# Patient Record
Sex: FEMALE | Race: WHITE
[De-identification: names, ages, dates, MRNs, and addresses within clinical notes are randomized per-mention and may not be internally consistent; named-entity substitution may affect disease eponyms.]

---

## 2018-01-31 ENCOUNTER — HOSPITAL ENCOUNTER (EMERGENCY)
Dept: HOSPITAL 53 - M ED | Age: 28
Discharge: HOME | End: 2018-01-31
Payer: COMMERCIAL
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
## 2018-01-31 DIAGNOSIS — S60.222A: Primary | ICD-10-CM

## 2018-01-31 PROCEDURE — 73130 X-RAY EXAM OF HAND: CPT

## 2019-06-22 ENCOUNTER — HOSPITAL ENCOUNTER (INPATIENT)
Dept: HOSPITAL 53 - M PCU | Age: 29
LOS: 6 days | Discharge: HOME | DRG: 776 | End: 2019-06-28
Payer: COMMERCIAL

## 2019-06-22 VITALS — WEIGHT: 156.53 LBS | BODY MASS INDEX: 28.8 KG/M2 | HEIGHT: 62 IN

## 2019-06-22 VITALS — SYSTOLIC BLOOD PRESSURE: 116 MMHG | DIASTOLIC BLOOD PRESSURE: 73 MMHG

## 2019-06-22 VITALS — DIASTOLIC BLOOD PRESSURE: 68 MMHG | SYSTOLIC BLOOD PRESSURE: 141 MMHG

## 2019-06-22 VITALS — DIASTOLIC BLOOD PRESSURE: 55 MMHG | SYSTOLIC BLOOD PRESSURE: 114 MMHG

## 2019-06-22 DIAGNOSIS — D64.9: ICD-10-CM

## 2019-06-22 DIAGNOSIS — Z88.8: ICD-10-CM

## 2019-06-22 DIAGNOSIS — I26.99: ICD-10-CM

## 2019-06-22 DIAGNOSIS — Z88.0: ICD-10-CM

## 2019-06-22 DIAGNOSIS — K59.00: ICD-10-CM

## 2019-06-22 LAB
ALBUMIN SERPL BCG-MCNC: 1.9 GM/DL (ref 3.2–5.2)
ALT SERPL W P-5'-P-CCNC: 151 U/L (ref 12–78)
APTT BLD: 32.2 SECONDS (ref 25–38.4)
BILIRUB SERPL-MCNC: 0.3 MG/DL (ref 0.2–1)
BUN SERPL-MCNC: 6 MG/DL (ref 7–18)
CALCIUM SERPL-MCNC: 8.3 MG/DL (ref 8.5–10.1)
CHLORIDE SERPL-SCNC: 112 MEQ/L (ref 98–107)
CO2 SERPL-SCNC: 19 MEQ/L (ref 21–32)
CREAT SERPL-MCNC: 0.42 MG/DL (ref 0.55–1.3)
GFR SERPL CREATININE-BSD FRML MDRD: > 60 ML/MIN/{1.73_M2} (ref 60–?)
GLUCOSE SERPL-MCNC: 102 MG/DL (ref 70–100)
HCT VFR BLD AUTO: 26.1 % (ref 36–47)
HCT VFR BLD AUTO: 28 % (ref 36–47)
HGB BLD-MCNC: 8.6 G/DL (ref 12–15.5)
HGB BLD-MCNC: 9 G/DL (ref 12–15.5)
INR PPP: 1.19
MCH RBC QN AUTO: 29.3 PG (ref 27–33)
MCH RBC QN AUTO: 29.7 PG (ref 27–33)
MCHC RBC AUTO-ENTMCNC: 32.1 G/DL (ref 32–36.5)
MCHC RBC AUTO-ENTMCNC: 33 G/DL (ref 32–36.5)
MCV RBC AUTO: 90 FL (ref 80–96)
MCV RBC AUTO: 91.2 FL (ref 80–96)
PLATELET # BLD AUTO: 117 10^3/UL (ref 150–450)
PLATELET # BLD AUTO: 123 10^3/UL (ref 150–450)
POTASSIUM SERPL-SCNC: 3.5 MEQ/L (ref 3.5–5.1)
PROT SERPL-MCNC: 5.6 GM/DL (ref 6.4–8.2)
PROTHROMBIN TIME: 14.8 SECONDS (ref 11.8–14)
RBC # BLD AUTO: 2.9 10^6/UL (ref 4–5.4)
RBC # BLD AUTO: 3.07 10^6/UL (ref 4–5.4)
SODIUM SERPL-SCNC: 140 MEQ/L (ref 136–145)
TROPONIN I SERPL-MCNC: 0.1 NG/ML (ref ?–0.1)
WBC # BLD AUTO: 8.2 10^3/UL (ref 4–10)
WBC # BLD AUTO: 9 10^3/UL (ref 4–10)

## 2019-06-22 RX ADMIN — HEPARIN SODIUM AND DEXTROSE SCH MLS/HR: 10000; 5 INJECTION INTRAVENOUS at 16:13

## 2019-06-22 RX ADMIN — SODIUM CHLORIDE SCH MLS/HR: 9 INJECTION, SOLUTION INTRAVENOUS at 21:19

## 2019-06-22 RX ADMIN — PROBIOTIC PRODUCT - TAB SCH EA: TAB at 21:18

## 2019-06-22 RX ADMIN — ACETAMINOPHEN PRN MG: 325 TABLET ORAL at 21:18

## 2019-06-22 RX ADMIN — PROBIOTIC PRODUCT - TAB SCH EA: TAB at 16:00

## 2019-06-22 RX ADMIN — DOCUSATE SODIUM,SENNOSIDES SCH TAB: 50; 8.6 TABLET, FILM COATED ORAL at 21:18

## 2019-06-22 RX ADMIN — ACETAMINOPHEN PRN MG: 325 TABLET ORAL at 14:44

## 2019-06-22 NOTE — HPEPDOC
General


Date of Admission


Jun 22, 2019 at 12:57


Date of Service:  Jun 22, 2019


Chief Complaint


The patient is a 28-year-old female admitted with a reason for visit of Severe 

Sepsis Post Partum, Pe.





History of Present Illness


27 yo F, post-partum day 3, no other known PMH transferred from [facility] while she was treated for severe sepsis under Dr. Sherri Dotson. She had a normal delivery on 6/19/2019, and she was successfully 

discharged. However, she developed a fever, chills, and presented to Dr. Dotson's office. She was found to have leukocytosis up to 21.6 with symptoms of 

sepsis. Therefore, the patient was hospitalized. Initial CBC 21.6>10.1<175. 

Lactate 3.6. She was tachycardic. She has evidence of endometritis. Of note, she

was found to have positive urine culture in Feb 2019 with Strep Agalactiae group

B, but the patient decided not to be treated. During this hospital admission, 

two sets of blood culture shows G + cocci. Urine culture is in progress, but UA 

showed large UE, blood, and WBC 30-50. Lactate has normalized to 0.9. However, 

she was still tachycardic to 130-140. Therefore, Dr. Dotson had CT angio, which 

confirmed PE in b/l lungs. Given post-partum d 3 bleeding, she received lovenox 

40 subcut. Given complicated hospital course, the patient was transferred to 

[facility] for further medical management. 





Upon arrival, /68, , 95-97% on RA. She is febrile at 101F.





Home Medications


Scheduled


Clindamycin in 0.9 % Sod Chlor (Clindamycin 900 mg/50 ml-Ns) 900 Mg/50 Ml 

Piggyback, 900 MG IV Q8H, (Reported)


   GIVEN AT [facility] 


Docusate Sodium (Colace) 100 Mg Capsule, 100 MG PO BID, (Reported)


Enoxaparin Sodium (Lovenox) 40 Mg/0.4 Ml Syringe, 40 MG SC DAILY, (Reported)


   GIVEN AT [facility] 


Ferrous Sulfate (Iron) 325 Mg Tablet, 325 MG PO Q8H, (Reported)


Folic Acid (Folic Acid) 1 Mg Tablet, 1 MG PO DAILY, (Reported)


Gentamicin in NaCl, Iso-Osm (Gentamicin 100 mg/Ns 100 ml) 100 Mg/100 Ml 

Piggyback, 110 MG IV Q8H, (Reported)


   GIVEN AT [facility] 


Prenatal Vit37/Iron/Folic Acid (Prenata Chewable Tablet) 1 Each Tab.chew, 1 CHW 

PO DAILY, (Reported)


Sennosides/Docusate Sodium (Senna-S Tablet) 1 Each Tablet, 1 TAB PO BID, 

(Reported)





Scheduled PRN


Acetaminophen (Acetaminophen) 325 Mg Tablet, 650 MG PO Q4H PRN for PAIN, 

(Reported)


Ibuprofen (Ibuprofen) 800 Mg Tablet, 800 MG PO TID PRN for PAIN, (Reported)





Allergies


Coded Allergies:  


     amoxicillin (Verified  Allergy, Unknown, HIVES, 6/22/19)


     cefprozil (Verified  Allergy, Unknown, HIVES, 6/22/19)





Past Medical History


Medical History


none


Surgical History


none





Family History


no significant FH





Social History


* Smoker:  Denies


Alcohol:  Denies


see above





A-FIB/CHADSVASC


A-FIB History


Current/History of A-Fib/PAF?:  No





Review of Systems


Constitutional:  Reports: Chills, Fever, Weakness


Eyes:  Denies: Pain, Vision change, Conjunctivae inflammation, Eyelid 

inflammation, Redness, Other


ENT:  Denies: Head Aches, Ear Pain, Dysphagia, Sinus Congestion, Post Nasal 

Drip, Sore Throat, Epistaxis, Other Symptoms


Skin:  Denies: Rash, Lesions, Jaundice, Bruising, Itching, Dry, Breakdown, Nail 

Changes, Other


Pulmonary:  Reports: Other Symptoms (chest tightness)


Cardiovascular:  Denies: Chest Pain, Palpitations, Orthopnea, Paroxysmal Noc. 

Dyspnea, Edema, Lt Headedness, Other Symptoms


Gastrointestinal:  Reports: Abdominal Pain, Constipation


Genitourinary:  Denies: Dysuria, Frequency, Incontinence, Hematuria, Retention, 

Other Symptoms


Hematologic:  Denies: Bruising, Bleeding Excessively, Petecchia, Purpura, 

Enlarged Lymph Nodes, Other Hematologic


Endocrine:  Denies: Polydipsia, Polyphagia, Polyuria, Heat Intolerance, Cold 

Intolerance, Other Endocrine Sx


Musculoskeletal:  Denies: Neck Pain, Back Pain, Shoulder Pain, Arm Pain, Hand 

Pain, Leg Pain, Foot Pain, Joint Pain, Muscle Pain, Spasms, Other Symptoms


Neurological:  Denies: Weakness, Numbness, Incoordination, Change in speech, 

Confusion, Seizures, Other Symptoms


Psych:  Denies: Mood Normal, Anxiety, Depression, Memory Issues, Thoughts of Verito

f Harm, Anger, Thoughts of Harming Other, Other Psych





Physical Examination


General Exam:  Positive: Alert, Cooperative, No Acute Distress


Eye Exam:  Positive: PERRLA


ENT Exam:  Positive: Atraumatic, Mucous membr. moist/pink


Neck Exam:  Positive: Supple


Chest Exam:  Positive: Clear to auscultation, Normal air movement


Heart Exam:  Positive: Tachycardic, Regular Rhythm


Abdomen Exam:  Positive: Normal bowel sounds


Extremity Exam:  Positive: Other (+1 edema b/l)


Skin Exam:  Positive: Nl turgor and temperature


Neuro Exam:  Positive: Normal Speech


Psych Exam:  Positive: Mood NL





Vital Signs





Vital Signs








  Date Time  Temp Pulse Resp B/P (MAP) Pulse Ox O2 Delivery O2 Flow Rate FiO2


 


6/22/19 13:35 101.4   116/73 (87)    


 


6/22/19 13:10  134   97   











Problems





(1) Severe sepsis


Problem Text:  # Severe sepsis from endometritis


- The patient is febrile, tachycardic, leukocytosis, evidence of endometritis, 

with elevated lactic acid up to 3.6. Therefore, the patient has severe sepsis. 


- She is allergic to penicillin and cephalosporin. As mentioned in HPI, she 

declined to be treated for Group B strep treatment, and it was sensitive to 

clindamycin (ARIA<0.25) and vancomycin (ARIA 0.5). She received clindamycin and 

gentamicin at outside hospital. 


- Will send CBC/CMP, lactic acid, UA, urine culture, 2 sets of blood culture. 

Outside blood culture grows gram (+) cocci. 


- Will start clindamycin (bacteriocital), rather than vancomycin (bacteri

ostatic). She was clinically improving on clindamycin at outside hospital. Also 

start aztreonam, which will continue until blood culture completely r/o gram (-)

bacteria. 





# Pulmonary embolism


- Currently, she is tachycardic and feels like her lungs are not expanding. This

seems to be the symptom of PE. 


- Will start heparin gtt w/o bolus, given persistent vaginal bleeding from de

livery. Heparin gtt can increase bleeding. Therefore, will carefully watch the 

degree of vaginal bleeding. PE can be life threatening. Therefore, if she has 

increased bleeding, will transfuse. Q6h CBC and PTT. 


- Today is Saturday, not clear if echocardiogram is available, will order one. 

She has b/l lower ext edema +1, will order lower ext doppler to r/o DVT - likely

be done on Monday. 





# Anemia


- This is likely combination of IV hydration and bleeding from vagina. 


- Will give a dose of IV iron. 





# Constipation


- Continue stool softner. 





Dr. Dotson, [phone], [facility] nursing station [phone], need to contact

for culture sensitivity and speciation








Plan / VTE


VTE Prophylaxis Ordered?:  Yes











AYAZ AREVALO MD                Jun 22, 2019 14:24

## 2019-06-23 VITALS — SYSTOLIC BLOOD PRESSURE: 116 MMHG | DIASTOLIC BLOOD PRESSURE: 53 MMHG

## 2019-06-23 VITALS — DIASTOLIC BLOOD PRESSURE: 47 MMHG | SYSTOLIC BLOOD PRESSURE: 106 MMHG

## 2019-06-23 VITALS — SYSTOLIC BLOOD PRESSURE: 117 MMHG | DIASTOLIC BLOOD PRESSURE: 62 MMHG

## 2019-06-23 VITALS — DIASTOLIC BLOOD PRESSURE: 70 MMHG | SYSTOLIC BLOOD PRESSURE: 122 MMHG

## 2019-06-23 VITALS — SYSTOLIC BLOOD PRESSURE: 125 MMHG | DIASTOLIC BLOOD PRESSURE: 71 MMHG

## 2019-06-23 VITALS — SYSTOLIC BLOOD PRESSURE: 125 MMHG | DIASTOLIC BLOOD PRESSURE: 59 MMHG

## 2019-06-23 VITALS — SYSTOLIC BLOOD PRESSURE: 132 MMHG | DIASTOLIC BLOOD PRESSURE: 84 MMHG

## 2019-06-23 LAB
ALBUMIN SERPL BCG-MCNC: 2.1 GM/DL (ref 3.2–5.2)
ALT SERPL W P-5'-P-CCNC: 248 U/L (ref 12–78)
BILIRUB SERPL-MCNC: 0.5 MG/DL (ref 0.2–1)
BUN SERPL-MCNC: 8 MG/DL (ref 7–18)
CALCIUM SERPL-MCNC: 8 MG/DL (ref 8.5–10.1)
CHLORIDE SERPL-SCNC: 109 MEQ/L (ref 98–107)
CO2 SERPL-SCNC: 22 MEQ/L (ref 21–32)
CREAT SERPL-MCNC: 0.63 MG/DL (ref 0.55–1.3)
GFR SERPL CREATININE-BSD FRML MDRD: > 60 ML/MIN/{1.73_M2} (ref 60–?)
GLUCOSE SERPL-MCNC: 77 MG/DL (ref 70–100)
HCT VFR BLD AUTO: 29.7 % (ref 36–47)
HGB BLD-MCNC: 9.7 G/DL (ref 12–15.5)
MCH RBC QN AUTO: 29.2 PG (ref 27–33)
MCHC RBC AUTO-ENTMCNC: 32.7 G/DL (ref 32–36.5)
MCV RBC AUTO: 89.5 FL (ref 80–96)
PLATELET # BLD AUTO: 159 10^3/UL (ref 150–450)
POTASSIUM SERPL-SCNC: 3.7 MEQ/L (ref 3.5–5.1)
PROT SERPL-MCNC: 5.9 GM/DL (ref 6.4–8.2)
RBC # BLD AUTO: 3.32 10^6/UL (ref 4–5.4)
SODIUM SERPL-SCNC: 139 MEQ/L (ref 136–145)
WBC # BLD AUTO: 10.4 10^3/UL (ref 4–10)

## 2019-06-23 RX ADMIN — ACETAMINOPHEN PRN MG: 325 TABLET ORAL at 05:05

## 2019-06-23 RX ADMIN — ACETAMINOPHEN PRN MG: 325 TABLET ORAL at 22:28

## 2019-06-23 RX ADMIN — PROBIOTIC PRODUCT - TAB SCH EA: TAB at 21:23

## 2019-06-23 RX ADMIN — ACETAMINOPHEN PRN MG: 325 TABLET ORAL at 17:20

## 2019-06-23 RX ADMIN — ACETAMINOPHEN PRN MG: 325 TABLET ORAL at 10:09

## 2019-06-23 RX ADMIN — SODIUM CHLORIDE SCH MLS/HR: 9 INJECTION, SOLUTION INTRAVENOUS at 21:23

## 2019-06-23 RX ADMIN — DOCUSATE SODIUM,SENNOSIDES SCH TAB: 50; 8.6 TABLET, FILM COATED ORAL at 21:23

## 2019-06-23 RX ADMIN — PROBIOTIC PRODUCT - TAB SCH EA: TAB at 10:09

## 2019-06-23 RX ADMIN — PROBIOTIC PRODUCT - TAB SCH EA: TAB at 17:28

## 2019-06-23 RX ADMIN — SODIUM CHLORIDE SCH MLS/HR: 9 INJECTION, SOLUTION INTRAVENOUS at 14:46

## 2019-06-23 RX ADMIN — SODIUM CHLORIDE SCH MLS/HR: 9 INJECTION, SOLUTION INTRAVENOUS at 06:30

## 2019-06-23 RX ADMIN — DOCUSATE SODIUM,SENNOSIDES SCH TAB: 50; 8.6 TABLET, FILM COATED ORAL at 10:08

## 2019-06-23 RX ADMIN — HEPARIN SODIUM AND DEXTROSE SCH MLS/HR: 10000; 5 INJECTION INTRAVENOUS at 12:36

## 2019-06-23 NOTE — ECHO
DATE OF STUDY:  06/22/2019

 

REFERRING PHYSICIAN:  Dr. Rubi Winston

 

INDICATION:  Acute pulmonary embolism.

 

HEIGHT:  61 inches.

 

WEIGHT:  154 pounds.

 

2D MEASUREMENTS:

Ventricular septum 0.8 cm

Posterior wall 0.87 cm

Left ventricle diastole 4.6 cm

Left atrium 3.0 cm

Aortic root 3.0 cm

Inferior vena cava 1.6 cm (more than 50% respiratory variation)

 

DOPPLER MEASUREMENTS:

Aortic valve velocity 218 cm/s

LVOT velocity 163 cm/s

LVOT VTI 25.9 cm

Mitral E velocity 104 cm/s

Mitral A velocity 135 cm/s

Very mild tricuspid regurgitation

Estimated right ventricle systolic pressure 39-44 mmHg assuming a right atrial

pressure of 5-10 mmHg

 

MITRAL ANNULAR TISSUE DOPPLER:

E prime septal 14.7 cm/s

E prime lateral 24.6 cm/s

 

DESCRIPTION:

Rhythm was sinus tachycardia.  Image quality was good.  No pericardial effusion.

This was a 2D, M mode, color flow Doppler and pulse wave Doppler examination and

included mitral annular tissue Doppler.

 

CONCLUSIONS:

1.  Suggestive of at least mild-moderate elevation of estimated right ventricle

systolic pressure (at least 39-44 mmHg).  Normal right ventricle size and

systolic function.  Normal central venous pressure (estimated to be  5-10 mmHg).

 

 

2.  Normal left ventricle internal dimensions and wall thickness.  Normal

regional LV wall motion and wall thickening.  Normal LV systolic function.  LVEF

65-70% by visual estimate.  Normal LV diastolic function.

 

3.  Otherwise, normal appearing echocardiogram Doppler findings.

## 2019-06-23 NOTE — IPNPDOC
Date Seen


The patient was seen on 6/23/19.





Progress Note


SUBJECTIVE: Ms Babb is a 28-year-old female who is postpartum. She unfortunately

has had complications of sepsis, endometritis and bilateral pulmonary emboli. 

She is not having remarkable pain. She is having discomfort with deep 

inspiration.





OBJECTIVE


PHYSICAL EXAMINATION:


VITAL SIGNS: Please see below. 


GENERAL: [Awake, alert, conversant. She is noted to have a sallow complexion but

 is otherwise not ill appearing]


HEENT: [Neck is supple with no adenopathy or thyromegaly, oral mucosa is moist, 

she has good dentition, she does not have scleral icterus or injection.]


CARDIOVASCULAR: [Regular rate and rhythm, no appreciable murmur].


RESPIRATORY: [Patient overall has good air movement, she does have tightness 

with inspiration, which induces cough.].


ABDOMINAL: [Soft, post-gravid, nondistended, bowel tones are present]


EXTREMITIES: [No peripheral edema, pedal pulses are palpable]


NEUROLOGICAL: [No focal neuromotor or sensory deficit]


PSYCHOLOGICAL: [Cognition, judgment and insight appear to be intact.]





LABORATORY DATA, IMAGING STUDIES, MICROBIOLOGY: Please see below.





Echocardiogram: [Echocardiogram shows normal ejection fraction of 65-70% with 

normal diastolic function. Some increased right ventricular systolic pressure 39

 and 44 mmHg which could be consistent with pulmonary embolus.].





DVT prophylaxis ordered?: [heparin gtt]





ASSESSMENT/PLAN: 





1. Sepsis--patient has endometritis. Blood cultures from outlying facility are 

positive for strep group B. Patient remains on meropenem. She has been afebrile 

and leukocytosis is resolved.





2. Bilateral pulmonary emboli--patient is on anticoagulation with heparin drip. 

She is expectant of lower extremity Dopplers as she does have lower extremity 

swelling. She will need to be on longer term anticoagulation. We will need to 

consider the fact that she plans to breast-feed in selecting an appropriate 

agent.





3. Elevated liver enzymes--the etiology of these is unclear. Bilirubin is 

normal. We will likely arrange for right upper quadrant ultrasound to eval the 

gallbladder and biliary tree.. She may also need vascular ultrasound of the area

as well.








DISPOSITION: .





VS, I&O, 24H, Fishbone


Vital Signs/I&O





Vital Signs








  Date Time  Temp Pulse Resp B/P (MAP) Pulse Ox O2 Delivery O2 Flow Rate FiO2


 


6/23/19 16:00 97.5 120 18 132/84 (100) 95   














I&O- Last 24 Hours up to 6 AM 


 


 6/23/19





 06:00


 


Intake Total 2022 ml


 


Output Total 2050 ml


 


Balance -28 ml











Laboratory Data


24H LABS


Laboratory Tests 2


6/22/19 22:12: 


Nucleated Red Blood Cells % (auto) 0.0, Activated Partial Thromboplast Time 

73.5H, Troponin I 0.09


6/23/19 04:15: 


Nucleated Red Blood Cells % (auto) 0.0, Activated Partial Thromboplast Time 

71.9H


6/23/19 04:16: 


Anion Gap 8, Glomerular Filtration Rate > 60.0, Blood Urea Nitrogen 8, 

Creatinine 0.63, Sodium Level 139, Potassium Level 3.7, Chloride Level 109H, 

Carbon Dioxide Level 22, Calcium Level 8.0L, Aspartate Amino Transf (AST/SGOT) 

203H, Alanine Aminotransferase (ALT/SGPT) 248H, Alkaline Phosphatase 207H, Total

Bilirubin 0.5#, Total Protein 5.9L, Albumin 2.1L, Albumin/Globulin Ratio 0.55L


CBC/BMP


Laboratory Tests


6/22/19 22:12








Red Blood Count 2.90 L, Mean Corpuscular Volume 90.0, Mean Corpuscular 

Hemoglobin 29.7, Mean Corpuscular Hemoglobin Concent 33.0, Red Cell Distribution

Width 13.7





6/23/19 04:15








Red Blood Count 3.32 L, Mean Corpuscular Volume 89.5, Mean Corpuscular 

Hemoglobin 29.2, Mean Corpuscular Hemoglobin Concent 32.7, Red Cell Distribution

Width 13.9





6/23/19 04:16








Calcium Level 8.0 L, Aspartate Amino Transf (AST/SGOT) 203 H, Alanine 

Aminotransferase (ALT/SGPT) 248 H, Alkaline Phosphatase 207 H, Total Bilirubin 

0.5 #, Total Protein 5.9 L, Albumin 2.1 L


Microbiology





Microbiology


6/22/19 Blood Culture - Preliminary, Resulted


          No growth after 24 hours . All specim...


6/22/19 Blood Culture - Preliminary, Resulted


          No growth after 24 hours . All specim...











CLARK SONG MD         Jun 23, 2019 17:12

## 2019-06-24 VITALS — SYSTOLIC BLOOD PRESSURE: 119 MMHG | DIASTOLIC BLOOD PRESSURE: 77 MMHG

## 2019-06-24 VITALS — SYSTOLIC BLOOD PRESSURE: 130 MMHG | DIASTOLIC BLOOD PRESSURE: 70 MMHG

## 2019-06-24 VITALS — SYSTOLIC BLOOD PRESSURE: 127 MMHG | DIASTOLIC BLOOD PRESSURE: 70 MMHG

## 2019-06-24 VITALS — DIASTOLIC BLOOD PRESSURE: 73 MMHG | SYSTOLIC BLOOD PRESSURE: 131 MMHG

## 2019-06-24 VITALS — DIASTOLIC BLOOD PRESSURE: 74 MMHG | SYSTOLIC BLOOD PRESSURE: 135 MMHG

## 2019-06-24 VITALS — SYSTOLIC BLOOD PRESSURE: 127 MMHG | DIASTOLIC BLOOD PRESSURE: 69 MMHG

## 2019-06-24 VITALS — DIASTOLIC BLOOD PRESSURE: 70 MMHG | SYSTOLIC BLOOD PRESSURE: 130 MMHG

## 2019-06-24 LAB
ALBUMIN SERPL BCG-MCNC: 1.9 GM/DL (ref 3.2–5.2)
ALT SERPL W P-5'-P-CCNC: 172 U/L (ref 12–78)
BILIRUB SERPL-MCNC: 0.5 MG/DL (ref 0.2–1)
BUN SERPL-MCNC: 11 MG/DL (ref 7–18)
CALCIUM SERPL-MCNC: 7.7 MG/DL (ref 8.5–10.1)
CHLORIDE SERPL-SCNC: 112 MEQ/L (ref 98–107)
CO2 SERPL-SCNC: 19 MEQ/L (ref 21–32)
CREAT SERPL-MCNC: 0.58 MG/DL (ref 0.55–1.3)
GFR SERPL CREATININE-BSD FRML MDRD: > 60 ML/MIN/{1.73_M2} (ref 60–?)
GLUCOSE SERPL-MCNC: 80 MG/DL (ref 70–100)
POTASSIUM SERPL-SCNC: 3.3 MEQ/L (ref 3.5–5.1)
PROT SERPL-MCNC: 5.4 GM/DL (ref 6.4–8.2)
SODIUM SERPL-SCNC: 141 MEQ/L (ref 136–145)

## 2019-06-24 RX ADMIN — SODIUM CHLORIDE SCH MLS/HR: 9 INJECTION, SOLUTION INTRAVENOUS at 06:12

## 2019-06-24 RX ADMIN — MEROPENEM AND SODIUM CHLORIDE SCH MLS/HR: 1 INJECTION, SOLUTION INTRAVENOUS at 21:52

## 2019-06-24 RX ADMIN — HEPARIN SODIUM AND DEXTROSE SCH MLS/HR: 10000; 5 INJECTION INTRAVENOUS at 12:27

## 2019-06-24 RX ADMIN — PROBIOTIC PRODUCT - TAB SCH EA: TAB at 21:53

## 2019-06-24 RX ADMIN — PROBIOTIC PRODUCT - TAB SCH EA: TAB at 16:37

## 2019-06-24 RX ADMIN — DOCUSATE SODIUM,SENNOSIDES SCH TAB: 50; 8.6 TABLET, FILM COATED ORAL at 08:38

## 2019-06-24 RX ADMIN — POTASSIUM CHLORIDE SCH MEQ: 750 TABLET, EXTENDED RELEASE ORAL at 08:38

## 2019-06-24 RX ADMIN — IBUPROFEN PRN MG: 800 TABLET, FILM COATED ORAL at 16:37

## 2019-06-24 RX ADMIN — PROBIOTIC PRODUCT - TAB SCH EA: TAB at 08:38

## 2019-06-24 RX ADMIN — DOCUSATE SODIUM,SENNOSIDES SCH TAB: 50; 8.6 TABLET, FILM COATED ORAL at 21:53

## 2019-06-24 RX ADMIN — SODIUM CHLORIDE SCH MLS/HR: 9 INJECTION, SOLUTION INTRAVENOUS at 14:40

## 2019-06-24 NOTE — REP
Abdominal right upper quadrant ultrasound for elevated liver function tests:

There is ascites along the medial border of the hepatic right lobe.

Additionally, there is a right pleural effusion.

There is no cholelithiasis.

There is pericholecystic fluid, possibly ascites.

The gallbladder wall is thickened measuring up to 6.9 mm.  This may be secondary

to the ascites.

There is no intrahepatic or extrahepatic biliary duct dilatation.  The common

duct measures 2.2 mm.

The hepatic parenchyma is homogeneous and otherwise unremarkable.

The visualized portions of the pancreas are unremarkable.

There is no right renal calculus, mass, cyst or hydronephrosis.

The right kidney is normal size measuring 12.7 x 7.8 x 3.8 cm.

Impression:

There is ascites along the medial margin of the hepatic right lobe.

There is no cholelithiasis.

There is pericholecystic fluid, possibly ascites.

The gallbladder wall is thickened measuring up to 6.90 mm, possibly secondary to

the ascites.

There is no biliary duct dilatation.

 

 

Electronically Signed by

Francisco Ortiz MD 06/24/2019 08:33 A

## 2019-06-24 NOTE — REPVR
EXAM: 

 XR Chest, 1 View 



EXAM DATE/TIME: 

 6/24/2019 3:05 AM 



CLINICAL HISTORY: 

 28 years old, female; Shortness of breath; Patient HX: Septic, post partum, PE 



TECHNIQUE: 

 Imaging protocol: XR of the chest, 1 view. 



COMPARISON: 

 No relevant prior studies available. 



FINDINGS: 

 Lungs: There are bibasilar airspace opacities. 

 Pleural space: There is blunting of the costophrenic sulci bilaterally, which 

may indicate bilateral pleural effusions. No pneumothorax is identified. 

 Heart/Mediastinum: Unremarkable. No cardiomegaly. 

 Bones/joints: Unremarkable. 



IMPRESSION: 

1. Bibasilar airspace opacities, which represent atelectasis, pneumonia, or 

aspiration pneumonia. 

2. Blunting of the costophrenic sulci bilaterally, which may indicate bilateral 

pleural effusions. 



Electronically signed by: Franklyn Hermosillo On 06/24/2019  04:26:35 AM

## 2019-06-24 NOTE — REP
Clinical: Postpartum with bilateral lower extremity pain .

 

Technique:  Gray scale and color Doppler evaluation using linear high frequency

transducer.

 

Findings:

Ultrasound examination of the right and left lower extremity deep venous

structures from the common femoral vein to the popliteal vein demonstrates normal

compressibility flow and wave patterns in response to respiration and

augmentation.  There is no evidence for deep venous thrombosis.

 

Impression:

No evidence for deep venous thrombosis.

 

 

Electronically Signed by

Jason Villalta MD 06/24/2019 06:14 P

## 2019-06-24 NOTE — IPNPDOC
Date Seen


The patient was seen on 6/24/19.





Progress Note


SUBJECTIVE: This is a 28-year-old female with postpartum complications of 

sepsis, endometritis, and bilateral pulmonary emboli. She's been having some 

episodes of breathlessness and so is requiring some supplemental oxygen. She is 

not having chest pain. She is having some episodes of fever.





OBJECTIVE


PHYSICAL EXAMINATION:


VITAL SIGNS: Please see below. 


GENERAL: Awake, alert, conversant. She is noted to have a sallow complexion but 

is otherwise not ill appearing


HEENT: Neck is supple with no adenopathy or thyromegaly, oral mucosa is moist, 

she has good dentition, she does not have scleral icterus or injection.


CARDIOVASCULAR: Regular rate and rhythm, no appreciable murmur.


RESPIRATORY: Patient overall has good air movement, she does have tightness with

inspiration, which induces cough..


ABDOMINAL: Soft, post-gravid, nondistended, mildly tender to deep palpation 

bowel tones are present


EXTREMITIES: No peripheral edema, pedal pulses are palpable


NEUROLOGICAL: No focal neuromotor or sensory deficit


PSYCHOLOGICAL: Cognition, judgment and insight appear to be intact.


SKIN: The patient does not have jaundice





LABORATORY DATA, IMAGING STUDIES, MICROBIOLOGY: Please see below.





Echocardiogram: .





DVT prophylaxis ordered?: Patient is currently on a heparin drip





ASSESSMENT/PLAN:


1. Sepsis--patient has endometritis. Blood cultures from outlying facility are 

positive for strep group B. Patient remains on meropenem. She has allergies to 

penicillin and reported cephalosporin. Leukocytosis is resolved. She has had 

fever, however.





2. Bilateral pulmonary emboli--patient is on anticoagulation with heparin drip. 

She is expectant of lower extremity Dopplers as she does have mild lower 

extremity swelling. She will need to be on longer term anticoagulation. The 

patient can be started on Coumadin therapy; it is not excreted into breast milk 

and consequently is not contraindicated. She can be transitioned to Lovenox for 

bridge therapy.





3. Elevated liver enzymes--the etiology of these is unclear. Bilirubin is 

normal. Liver enzyme levels are decreasing. Ultrasound is negative for ch

olelithiasis or cholecystitis. We'll continue to monitor.





4. The patient had had some breast firmness and discomfort. She did not get 

relief from the breast pump. She has nursed her baby a few times and her milk 

flow is improving and this issue is resolved. There are no residual concerns for

mastitis at this time.











DISPOSITION: .





VS, I&O, 24H, Fishbone


Vital Signs/I&O





Vital Signs








  Date Time  Temp Pulse Resp B/P (MAP) Pulse Ox O2 Delivery O2 Flow Rate FiO2


 


6/24/19 16:00 102.7 131 20 135/74 (94) 97  3.0 














I&O- Last 24 Hours up to 6 AM 


 


 6/24/19





 06:00


 


Intake Total 1406 ml


 


Output Total 2700 ml


 


Balance -1294 ml











Laboratory Data


24H LABS


Laboratory Tests 2


6/24/19 05:55: 


Anion Gap 10, Glomerular Filtration Rate > 60.0, Blood Urea Nitrogen 11, 

Creatinine 0.58, Sodium Level 141, Potassium Level 3.3L, Chloride Level 112H, 

Carbon Dioxide Level 19L, Calcium Level 7.7L, Aspartate Amino Transf (AST/SGOT) 

94H, Alanine Aminotransferase (ALT/SGPT) 172H, Alkaline Phosphatase 243H, Total 

Bilirubin 0.5, Total Protein 5.4L, Albumin 1.9L, Albumin/Globulin Ratio 0.54L


6/24/19 05:56: Activated Partial Thromboplast Time 82.1H


CBC/BMP


Laboratory Tests


6/24/19 05:55








Calcium Level 7.7 L, Aspartate Amino Transf (AST/SGOT) 94 H, Alanine 

Aminotransferase (ALT/SGPT) 172 H, Alkaline Phosphatase 243 H, Total Bilirubin 

0.5, Total Protein 5.4 L, Albumin 1.9 L


Microbiology





Microbiology


6/22/19 Blood Culture - Preliminary, Resulted


          No Growth after 48 hours. All Specime...


6/22/19 Blood Culture - Preliminary, Resulted


          No Growth after 48 hours. All Specime...


6/24/19 Urine Culture, Received


          Pending











CLARK SONG MD         Jun 24, 2019 16:59

## 2019-06-25 VITALS — SYSTOLIC BLOOD PRESSURE: 126 MMHG | DIASTOLIC BLOOD PRESSURE: 67 MMHG

## 2019-06-25 VITALS — SYSTOLIC BLOOD PRESSURE: 120 MMHG | DIASTOLIC BLOOD PRESSURE: 72 MMHG

## 2019-06-25 VITALS — SYSTOLIC BLOOD PRESSURE: 119 MMHG | DIASTOLIC BLOOD PRESSURE: 65 MMHG

## 2019-06-25 VITALS — SYSTOLIC BLOOD PRESSURE: 132 MMHG | DIASTOLIC BLOOD PRESSURE: 82 MMHG

## 2019-06-25 VITALS — SYSTOLIC BLOOD PRESSURE: 115 MMHG | DIASTOLIC BLOOD PRESSURE: 63 MMHG

## 2019-06-25 VITALS — SYSTOLIC BLOOD PRESSURE: 141 MMHG | DIASTOLIC BLOOD PRESSURE: 67 MMHG

## 2019-06-25 LAB
ALBUMIN SERPL BCG-MCNC: 1.9 GM/DL (ref 3.2–5.2)
ALT SERPL W P-5'-P-CCNC: 140 U/L (ref 12–78)
BILIRUB SERPL-MCNC: 0.5 MG/DL (ref 0.2–1)
BUN SERPL-MCNC: 16 MG/DL (ref 7–18)
CALCIUM SERPL-MCNC: 8.1 MG/DL (ref 8.5–10.1)
CHLORIDE SERPL-SCNC: 115 MEQ/L (ref 98–107)
CO2 SERPL-SCNC: 18 MEQ/L (ref 21–32)
CREAT SERPL-MCNC: 0.6 MG/DL (ref 0.55–1.3)
GFR SERPL CREATININE-BSD FRML MDRD: > 60 ML/MIN/{1.73_M2} (ref 60–?)
GLUCOSE SERPL-MCNC: 101 MG/DL (ref 70–100)
INR PPP: 1.18
POTASSIUM SERPL-SCNC: 3.2 MEQ/L (ref 3.5–5.1)
PROT SERPL-MCNC: 5.7 GM/DL (ref 6.4–8.2)
PROTHROMBIN TIME: 14.7 SECONDS (ref 11.8–14)
SODIUM SERPL-SCNC: 143 MEQ/L (ref 136–145)

## 2019-06-25 RX ADMIN — POTASSIUM CHLORIDE SCH MEQ: 750 TABLET, EXTENDED RELEASE ORAL at 09:13

## 2019-06-25 RX ADMIN — PROBIOTIC PRODUCT - TAB SCH EA: TAB at 16:09

## 2019-06-25 RX ADMIN — PROBIOTIC PRODUCT - TAB SCH EA: TAB at 09:13

## 2019-06-25 RX ADMIN — HEPARIN SODIUM AND DEXTROSE SCH MLS/HR: 10000; 5 INJECTION INTRAVENOUS at 12:36

## 2019-06-25 RX ADMIN — PROBIOTIC PRODUCT - TAB SCH EA: TAB at 21:10

## 2019-06-25 RX ADMIN — Medication SCH: at 20:06

## 2019-06-25 RX ADMIN — Medication SCH: at 06:41

## 2019-06-25 RX ADMIN — SODIUM CHLORIDE PRN UNITS: 4.5 INJECTION, SOLUTION INTRAVENOUS at 06:39

## 2019-06-25 RX ADMIN — DOCUSATE SODIUM,SENNOSIDES SCH TAB: 50; 8.6 TABLET, FILM COATED ORAL at 09:13

## 2019-06-25 RX ADMIN — SODIUM CHLORIDE PRN UNITS: 4.5 INJECTION, SOLUTION INTRAVENOUS at 20:04

## 2019-06-25 RX ADMIN — DOCUSATE SODIUM,SENNOSIDES SCH TAB: 50; 8.6 TABLET, FILM COATED ORAL at 21:10

## 2019-06-25 RX ADMIN — WARFARIN SODIUM SCH MG: 5 TABLET ORAL at 16:09

## 2019-06-25 RX ADMIN — MEROPENEM AND SODIUM CHLORIDE SCH MLS/HR: 1 INJECTION, SOLUTION INTRAVENOUS at 06:02

## 2019-06-25 RX ADMIN — MEROPENEM AND SODIUM CHLORIDE SCH MLS/HR: 1 INJECTION, SOLUTION INTRAVENOUS at 14:09

## 2019-06-25 RX ADMIN — IBUPROFEN PRN MG: 800 TABLET, FILM COATED ORAL at 23:21

## 2019-06-25 RX ADMIN — IBUPROFEN PRN MG: 800 TABLET, FILM COATED ORAL at 03:47

## 2019-06-25 RX ADMIN — MEROPENEM AND SODIUM CHLORIDE SCH MLS/HR: 1 INJECTION, SOLUTION INTRAVENOUS at 21:11

## 2019-06-25 NOTE — IPNPDOC
Text Note


Date of Service


The patient was seen on 19.





NOTE





Pt was seen and examined at bedside. Pt is postpartum day #6. She denies any 

lower abdomen pain or tenderness. Denies any fever chills. Pt is breastfeeding 

her . Pt denies any episode of bleeding. INR <2 on warfarin 4 mg. Heparin

gtt running. Pt has good po intake, ambulates well. LE Doppler U/S negative for 

any DVT. Echo result was reviewed.





PHE:





VITAL SIGNS: Please see below. 


GENERAL: AAOx3 NAD


HEENT: no jaundice neck supple no thyromegaly


CARDIOVASCULAR: Regular rate and rhythm S1 S2


RESPIRATORY: clear bilat no wheeze no rales


ABDOMINAL: soft NT ND


EXTREMITIES: No peripheral edema, pedal pulses are palpable


NEUROLOGICAL: No focal neuromotor or sensory deficit


PSYCHOLOGICAL: mood affect appropriate








LABORATORY DATA, IMAGING STUDIES, MICROBIOLOGY: Please see below.





Echocardiogram: 





DESCRIPTION:


Rhythm was sinus tachycardia.  Image quality was good.  No pericardial effusion.


This was a 2D, M mode, color flow Doppler and pulse wave Doppler examination and


included mitral annular tissue Doppler.


 


CONCLUSIONS:


1.  Suggestive of at least mild-moderate elevation of estimated right ventricle


systolic pressure (at least 39-44 mmHg).  Normal right ventricle size and


systolic function.  Normal central venous pressure (estimated to be  5-10 mmHg).


 


 


2.  Normal left ventricle internal dimensions and wall thickness.  Normal


regional LV wall motion and wall thickening.  Normal LV systolic function.  LVEF


65-70% by visual estimate.  Normal LV diastolic function.


 


3.  Otherwise, normal appearing echocardiogram Doppler findings.








Vital Signs








  Date Time  Temp Pulse Resp B/P (MAP) Pulse Ox O2 Delivery O2 Flow Rate FiO2


 


19 12:00       3.0 


 


19 11:37 97.3 99 18 141/67 (91) 97  3.0 


 


19 08:00       3.0 


 


19 07:53 97.9 89 17 115/63 (80) 94  3.0 


 


19 04:00 100.3 120 16 126/67 (86) 93   


 


19 23:59 98.3 103 16 127/69 (88) 93   


 


19 20:00 99.2 112 16 119/77 (91) 92   


 


19 17:48 100.0       


 


19 16:00       2.0 


 


19 16:00 102.7 131 20 135/74 (94) 97  3.0 


 


19 14:49 102.0       














Intake & Output 


 


 19





 06:00


 


Intake Total 241 ml


 


Output Total 400 ml


 


Balance -159 ml





Laboratory Tests


19 05:35: 


Activated Partial Thromboplast Time 56.9H, Blood Urea Nitrogen 16, Creatinine 

0.60, Sodium Level 143, Potassium Level 3.2L, Chloride Level 115H, Carbon 

Dioxide Level 18L, Calcium Level 8.1L, Aspartate Amino Transf (AST/SGOT) 60H, 

Alanine Aminotransferase (ALT/SGPT) 140H, Alkaline Phosphatase 266H, Total 

Bilirubin 0.5, Total Protein 5.7L, Albumin 1.9L, Anion Gap 10, Glomerular 

Filtration Rate > 60.0, Fasting Glucose 101H, Albumin/Globulin Ratio 0.50L


19 10:22: 


Prothrombin Time 14.7H, Prothromb Time International Ratio 1.18


19 12:53: Activated Partial Thromboplast Time 82.3H





Microbiology


19 Urine Culture - Final, Complete


          





Current Medications








 Medications


  (Trade)  Dose


 Ordered  Sig/Bryn


 Route


 PRN Reason  Start Time


 Stop Time Status Last Admin


Dose Admin


 


 Heparin Sodium


  (Porcine)


  (Heparin)    ASDIRECTED  PRN


 IV


 SEE LABEL COMMENTS  19 14:00


    19 06:39


2,900 UNITS


 


 Heparin Sodium


  (Porcine) 92562


 units/IV


 Miscellaneous


 Supplies  250 ml @ 0


 mls/hr  Q0M


 IV


   19 13:58


    19 12:36


12 MLS/HR


 


 Ibuprofen


  (Advil)  800 mg  Q8HP  PRN


 PO


 FEVER  19 16:30


    19 03:47


800 MG


 


 Lactobacillus


 Acidophilus


  (Bacid)  1 ea  TID


 PO


   19 16:00


    19 09:13


1 EA


 


 Meropenem 1 gm/IV


 Miscellaneous


 Supplies  50 ml @ 


 100 mls/hr  Q8H


 IV


   19 22:00


    19 14:09


100 MLS/HR


 


 Non-Formulary


 Medication


  (Heparin Iv Rate


 Change


 Documentation ml/


 Hr)    ASDIRECTED


 XX


   19 14:00


 19 13:59  19 06:41


1,200


 


 Potassium Chloride


  (Micro-K


 Extencaps)  20 meq  DAILY


 PO


   19 09:00


    19 09:13


20 MEQ


 


 Senna/Docusate


 Sodium


  (Senokot S)  2 tab  BID


 PO


   19 21:00


    19 09:13


2 TAB


 


 Warfarin Sodium


  (Coumadin)  4 mg  DAILY@17


 PO


   19 17:00


    19 16:37


4 MG











ASSESSMENT/PLAN:





1. Sepsis sec to postpartum endometritis 


hemodynamically improved


WBC trended down


On meropenem due to PCN allergy


Blood Culture from outside [facility]


Blood Culture on  negative x2


Denies any lower abdomen pain tenderness or vaginal discharge





2.Bilateral PE


Echo reviewed, mild to moderate stress on Rt Ventricle


Cont close clinical monitoring


As evidenced in CTA from outside, record not available


Con Heparin gtt 


Warfarin dose adjusted, INR in AM


Doppler LE negative





3. Elevated liver enzymes


cont to monitor 


likely sec to postpartum status





Discharge planning: pt can be dc home once INR therapeutic.





VS,Fishbone, I+O


VS, Fishbone, I+O


Laboratory Tests


19 05:35








Calcium Level 8.1 L, Aspartate Amino Transf (AST/SGOT) 60 H, Alanine 

Aminotransferase (ALT/SGPT) 140 H, Alkaline Phosphatase 266 H, Total Bilirubin 

0.5, Total Protein 5.7 L, Albumin 1.9 L








Vital Signs








  Date Time  Temp Pulse Resp B/P (MAP) Pulse Ox O2 Delivery O2 Flow Rate FiO2


 


19 12:00       3.0 


 


19 11:37 97.3 99 18 141/67 (91) 97   














I&O- Last 24 Hours up to 6 AM 


 


 19





 06:00


 


Intake Total 241 ml


 


Output Total 400 ml


 


Balance -159 ml

















BHARATHI AQUINO MD             2019 14:14

## 2019-06-26 VITALS — DIASTOLIC BLOOD PRESSURE: 70 MMHG | SYSTOLIC BLOOD PRESSURE: 131 MMHG

## 2019-06-26 VITALS — SYSTOLIC BLOOD PRESSURE: 122 MMHG | DIASTOLIC BLOOD PRESSURE: 64 MMHG

## 2019-06-26 VITALS — SYSTOLIC BLOOD PRESSURE: 135 MMHG | DIASTOLIC BLOOD PRESSURE: 73 MMHG

## 2019-06-26 VITALS — DIASTOLIC BLOOD PRESSURE: 60 MMHG | SYSTOLIC BLOOD PRESSURE: 122 MMHG

## 2019-06-26 VITALS — DIASTOLIC BLOOD PRESSURE: 66 MMHG | SYSTOLIC BLOOD PRESSURE: 124 MMHG

## 2019-06-26 VITALS — DIASTOLIC BLOOD PRESSURE: 60 MMHG | SYSTOLIC BLOOD PRESSURE: 123 MMHG

## 2019-06-26 VITALS — SYSTOLIC BLOOD PRESSURE: 134 MMHG | DIASTOLIC BLOOD PRESSURE: 91 MMHG

## 2019-06-26 LAB
ALBUMIN SERPL BCG-MCNC: 1.8 GM/DL (ref 3.2–5.2)
ALT SERPL W P-5'-P-CCNC: 109 U/L (ref 12–78)
BILIRUB SERPL-MCNC: 0.3 MG/DL (ref 0.2–1)
BUN SERPL-MCNC: 15 MG/DL (ref 7–18)
CALCIUM SERPL-MCNC: 7.9 MG/DL (ref 8.5–10.1)
CHLORIDE SERPL-SCNC: 115 MEQ/L (ref 98–107)
CO2 SERPL-SCNC: 20 MEQ/L (ref 21–32)
CREAT SERPL-MCNC: 0.52 MG/DL (ref 0.55–1.3)
EOSINOPHIL NFR BLD MANUAL: 2 % (ref 0–5)
GFR SERPL CREATININE-BSD FRML MDRD: > 60 ML/MIN/{1.73_M2} (ref 60–?)
GLUCOSE SERPL-MCNC: 72 MG/DL (ref 70–100)
HCT VFR BLD AUTO: 24.4 % (ref 36–47)
HGB BLD-MCNC: 7.9 G/DL (ref 12–15.5)
INR PPP: 1.56
LYMPHOCYTES NFR BLD MANUAL: 35 % (ref 16–52)
MCH RBC QN AUTO: 28.3 PG (ref 27–33)
MCHC RBC AUTO-ENTMCNC: 32.4 G/DL (ref 32–36.5)
MCV RBC AUTO: 87.5 FL (ref 80–96)
MONOCYTES NFR BLD MANUAL: 7 % (ref 0–8)
NEUTROPHILS NFR BLD MANUAL: 56 % (ref 35–75)
PLATELET # BLD AUTO: 227 10^3/UL (ref 150–450)
PLATELET BLD QL SMEAR: NORMAL
POTASSIUM SERPL-SCNC: 3.6 MEQ/L (ref 3.5–5.1)
PROT SERPL-MCNC: 5.5 GM/DL (ref 6.4–8.2)
PROTHROMBIN TIME: 18.4 SECONDS (ref 11.8–14)
RBC # BLD AUTO: 2.79 10^6/UL (ref 4–5.4)
RBC MORPH BLD: NORMAL
SODIUM SERPL-SCNC: 144 MEQ/L (ref 136–145)
WBC # BLD AUTO: 9.9 10^3/UL (ref 4–10)

## 2019-06-26 RX ADMIN — MEROPENEM AND SODIUM CHLORIDE SCH MLS/HR: 1 INJECTION, SOLUTION INTRAVENOUS at 06:08

## 2019-06-26 RX ADMIN — MEROPENEM AND SODIUM CHLORIDE SCH MLS/HR: 1 INJECTION, SOLUTION INTRAVENOUS at 14:16

## 2019-06-26 RX ADMIN — MEROPENEM AND SODIUM CHLORIDE SCH MLS/HR: 1 INJECTION, SOLUTION INTRAVENOUS at 21:23

## 2019-06-26 RX ADMIN — Medication SCH: at 04:32

## 2019-06-26 RX ADMIN — WARFARIN SODIUM SCH MG: 5 TABLET ORAL at 16:14

## 2019-06-26 RX ADMIN — FERROUS GLUCONATE SCH MG: 324 TABLET ORAL at 21:24

## 2019-06-26 RX ADMIN — HEPARIN SODIUM AND DEXTROSE SCH MLS/HR: 10000; 5 INJECTION INTRAVENOUS at 10:33

## 2019-06-26 RX ADMIN — PROBIOTIC PRODUCT - TAB SCH EA: TAB at 21:23

## 2019-06-26 RX ADMIN — DOCUSATE SODIUM,SENNOSIDES SCH TAB: 50; 8.6 TABLET, FILM COATED ORAL at 21:23

## 2019-06-26 RX ADMIN — IBUPROFEN PRN MG: 800 TABLET, FILM COATED ORAL at 16:13

## 2019-06-26 RX ADMIN — PROBIOTIC PRODUCT - TAB SCH EA: TAB at 16:13

## 2019-06-26 RX ADMIN — POTASSIUM CHLORIDE SCH MEQ: 750 TABLET, EXTENDED RELEASE ORAL at 09:28

## 2019-06-26 RX ADMIN — DOCUSATE SODIUM,SENNOSIDES SCH TAB: 50; 8.6 TABLET, FILM COATED ORAL at 09:29

## 2019-06-26 RX ADMIN — PROBIOTIC PRODUCT - TAB SCH EA: TAB at 09:29

## 2019-06-26 NOTE — IPNPDOC
Text Note


Date of Service


The patient was seen on 6/26/19.





NOTE





Pt was seen and examined at bedside. Pt denies any lower abdomen pain, no ep

isode of bleeding, heparin drip continues,  at bedside. Discussed with pt

and  plan for anticoagulation and antibiotic therapy upon DC home.





PHE:





VITAL SIGNS: Please see below. 


GENERAL: AAOx3 NAD pt severely pale appearing


HEENT: no jaundice neck supple no thyromegaly


CARDIOVASCULAR: Regular rate and rhythm S1 S2


RESPIRATORY: clear bilat no wheeze no rales


ABDOMINAL: soft NT ND


EXTREMITIES: No peripheral edema, pedal pulses are palpable


NEUROLOGICAL: No focal neuromotor or sensory deficit


PSYCHOLOGICAL: mood affect appropriate








Echocardiogram: 





DESCRIPTION:


Rhythm was sinus tachycardia.  Image quality was good.  No pericardial effusion.


This was a 2D, M mode, color flow Doppler and pulse wave Doppler examination and


included mitral annular tissue Doppler.


 


CONCLUSIONS:


1.  Suggestive of at least mild-moderate elevation of estimated right ventricle


systolic pressure (at least 39-44 mmHg).  Normal right ventricle size and


systolic function.  Normal central venous pressure (estimated to be  5-10 mmHg).


 


 


2.  Normal left ventricle internal dimensions and wall thickness.  Normal


regional LV wall motion and wall thickening.  Normal LV systolic function.  LVEF


65-70% by visual estimate.  Normal LV diastolic function.


 


3.  Otherwise, normal appearing echocardiogram Doppler findings.


Vital Signs








  Date Time  Temp Pulse Resp B/P (MAP) Pulse Ox O2 Delivery O2 Flow Rate FiO2


 


6/26/19 16:12 98.9 104 18 135/73 (93) 96   


 


6/26/19 16:00 98.6 93 18 134/91 (105) 94   


 


6/26/19 11:47 97.1 109 18 122/60 (80) 96   


 


6/26/19 09:15     96   


 


6/26/19 08:00 97.4 85 18 123/60 (81) 98  2.0 


 


6/26/19 04:00 97.5 82 18 122/64 (83) 99  2.0 


 


6/26/19 04:00       2.0 


 


6/26/19 00:00       2.0 


 


6/25/19 23:59 99.6 100 20 120/72 (88) 96  2.0 


 


6/25/19 20:00 99.4 115 20 119/65 (83) 96  2.0 


 


6/25/19 20:00       2.0 














Intake & Output 


 


 6/26/19





 06:00


 


Intake Total 1245 ml


 


Output Total 650 ml


 


Balance 595 ml





Laboratory Tests


6/25/19 18:53: Activated Partial Thromboplast Time 62.6H


6/26/19 02:28: Activated Partial Thromboplast Time 137.9*H


6/26/19 06:11: 


White Blood Count 9.9, Red Blood Count 2.79L, Hemoglobin 7.9L, Hematocrit 24.4L,

Mean Corpuscular Volume 87.5, Mean Corpuscular Hemoglobin 28.3, Mean Corpuscular

Hemoglobin Concent 32.4, Red Cell Distribution Width 13.7, Platelet Count 227, 

Immature Granulocyte % (Auto) , Nucleated Red Blood Cells % (auto) 0.2H, 

Neutrophils 56, Lymphocytes (Manual) 35, Monocytes (Manual) 7, Eosinophils 

(Manual) 2, Platelet Estimate NORMAL, Red Blood Cell Morphology NORMAL, 

Prothrombin Time 18.4H, Prothromb Time International Ratio 1.56, Blood Urea 

Nitrogen 15, Creatinine 0.52L, Sodium Level 144, Potassium Level 3.6, Chloride 

Level 115H, Carbon Dioxide Level 20L, Calcium Level 7.9L, Aspartate Amino Transf

(AST/SGOT) 43H, Alanine Aminotransferase (ALT/SGPT) 109H, Alkaline Phosphatase 

293H, Total Bilirubin 0.3, Total Protein 5.5L, Albumin 1.8L, Anion Gap 9, 

Glomerular Filtration Rate > 60.0, Fasting Glucose 72, Albumin/Globulin Ratio 

0.49L


6/26/19 10:34: Activated Partial Thromboplast Time 80.1H


6/26/19 16:39: Activated Partial Thromboplast Time 85.7H





Microbiology


6/24/19 Urine Culture - Final, Complete


          





Current Medications








 Medications


  (Trade)  Dose


 Ordered  Sig/Bryn


 Route


 PRN Reason  Start Time


 Stop Time Status Last Admin


Dose Admin


 


 Heparin Sodium


  (Porcine)


  (Heparin)    ASDIRECTED  PRN


 IV


 SEE LABEL COMMENTS  6/22/19 14:00


    6/25/19 20:04


2,900 UNITS


 


 Heparin Sodium


  (Porcine) 36771


 units/IV


 Miscellaneous


 Supplies  250 ml @ 0


 mls/hr  Q0M


 IV


   6/22/19 13:58


    6/26/19 10:33


11 MLS/HR


 


 Ibuprofen


  (Advil)  800 mg  Q8HP  PRN


 PO


 FEVER  6/24/19 16:30


    6/26/19 16:13


800 MG


 


 Lactobacillus


 Acidophilus


  (Bacid)  1 ea  TID


 PO


   6/22/19 16:00


    6/26/19 16:13


1 EA


 


 Meropenem 1 gm/IV


 Miscellaneous


 Supplies  50 ml @ 


 100 mls/hr  Q8H


 IV


   6/24/19 22:00


    6/26/19 14:16


100 MLS/HR


 


 Non-Formulary


 Medication


  (Heparin Iv Rate


 Change


 Documentation ml/


 Hr)    ASDIRECTED


 XX


   6/22/19 14:00


 6/27/19 13:59  6/26/19 04:32


1,100


 


 Potassium Chloride


  (Micro-K


 Extencaps)  20 meq  DAILY


 PO


   6/24/19 09:00


    6/26/19 09:28


20 MEQ


 


 Senna/Docusate


 Sodium


  (Senokot S)  2 tab  BID


 PO


   6/22/19 21:00


    6/26/19 09:29


2 TAB


 


 Warfarin Sodium


  (Coumadin)  5 mg  DAILY@17


 PO


   6/25/19 17:00


    6/26/19 16:14


5 MG











ASSESSMENT/PLAN:





1. Sepsis sec to postpartum endometritis 


hemodynamically improved


WBC trended down


On meropenem due to PCN allergy


Blood Culture from outside [facility]


Blood Culture on 6/22 negative x2


Denies any lower abdomen pain tenderness or vaginal discharge


switch to PO upon DC





2.Bilateral PE


Echo reviewed, mild to moderate stress on Rt Ventricle


Cont close clinical monitoring


As evidenced in CTA from outside, record not available


Con Heparin gtt 


Warfarin dose adjusted, INR in AM


Doppler LE negative





3. Elevated liver enzymes


cont to monitor 


likely sec to postpartum status





Discharge planning: pt can be dc home once INR therapeutic.





VS,Fishbone, I+O


VS, Fishbone, I+O


Laboratory Tests


6/26/19 06:11








Red Blood Count 2.79 L, Mean Corpuscular Volume 87.5, Mean Corpuscular 

Hemoglobin 28.3, Mean Corpuscular Hemoglobin Concent 32.4, Red Cell Distribution

Width 13.7, Calcium Level 7.9 L, Aspartate Amino Transf (AST/SGOT) 43 H, Alanine

Aminotransferase (ALT/SGPT) 109 H, Alkaline Phosphatase 293 H, Total Bilirubin 

0.3, Total Protein 5.5 L, Albumin 1.8 L








Vital Signs








  Date Time  Temp Pulse Resp B/P (MAP) Pulse Ox O2 Delivery O2 Flow Rate FiO2


 


6/26/19 16:12 98.9 104 18 135/73 (93) 96   


 


6/26/19 08:00       2.0 














I&O- Last 24 Hours up to 6 AM 


 


 6/26/19





 06:00


 


Intake Total 1245 ml


 


Output Total 650 ml


 


Balance 595 ml

















BHARATHI AQUINO MD             Jun 26, 2019 18:12

## 2019-06-27 VITALS — SYSTOLIC BLOOD PRESSURE: 124 MMHG | DIASTOLIC BLOOD PRESSURE: 69 MMHG

## 2019-06-27 VITALS — SYSTOLIC BLOOD PRESSURE: 124 MMHG | DIASTOLIC BLOOD PRESSURE: 59 MMHG

## 2019-06-27 VITALS — SYSTOLIC BLOOD PRESSURE: 133 MMHG | DIASTOLIC BLOOD PRESSURE: 79 MMHG

## 2019-06-27 VITALS — SYSTOLIC BLOOD PRESSURE: 125 MMHG | DIASTOLIC BLOOD PRESSURE: 82 MMHG

## 2019-06-27 VITALS — SYSTOLIC BLOOD PRESSURE: 105 MMHG | DIASTOLIC BLOOD PRESSURE: 64 MMHG

## 2019-06-27 LAB
ALBUMIN SERPL BCG-MCNC: 1.9 GM/DL (ref 3.2–5.2)
ALT SERPL W P-5'-P-CCNC: 96 U/L (ref 12–78)
APTT BLD: 96.6 SECONDS (ref 25–38.4)
BILIRUB SERPL-MCNC: 0.3 MG/DL (ref 0.2–1)
BUN SERPL-MCNC: 16 MG/DL (ref 7–18)
CALCIUM SERPL-MCNC: 8.1 MG/DL (ref 8.5–10.1)
CHLORIDE SERPL-SCNC: 115 MEQ/L (ref 98–107)
CO2 SERPL-SCNC: 21 MEQ/L (ref 21–32)
CREAT SERPL-MCNC: 0.45 MG/DL (ref 0.55–1.3)
GFR SERPL CREATININE-BSD FRML MDRD: > 60 ML/MIN/{1.73_M2} (ref 60–?)
GLUCOSE SERPL-MCNC: 87 MG/DL (ref 70–100)
HCT VFR BLD AUTO: 25.1 % (ref 36–47)
HGB BLD-MCNC: 8.2 G/DL (ref 12–15.5)
INR PPP: 2.56
MCH RBC QN AUTO: 29.4 PG (ref 27–33)
MCHC RBC AUTO-ENTMCNC: 32.7 G/DL (ref 32–36.5)
MCV RBC AUTO: 90 FL (ref 80–96)
PLATELET # BLD AUTO: 254 10^3/UL (ref 150–450)
POTASSIUM SERPL-SCNC: 3.9 MEQ/L (ref 3.5–5.1)
PROT SERPL-MCNC: 5.5 GM/DL (ref 6.4–8.2)
PROTHROMBIN TIME: 27.4 SECONDS (ref 11.8–14)
RBC # BLD AUTO: 2.79 10^6/UL (ref 4–5.4)
SODIUM SERPL-SCNC: 144 MEQ/L (ref 136–145)
WBC # BLD AUTO: 10.8 10^3/UL (ref 4–10)

## 2019-06-27 RX ADMIN — PROBIOTIC PRODUCT - TAB SCH EA: TAB at 21:51

## 2019-06-27 RX ADMIN — DOCUSATE SODIUM,SENNOSIDES SCH TAB: 50; 8.6 TABLET, FILM COATED ORAL at 07:48

## 2019-06-27 RX ADMIN — DOCUSATE SODIUM,SENNOSIDES SCH TAB: 50; 8.6 TABLET, FILM COATED ORAL at 21:51

## 2019-06-27 RX ADMIN — FERROUS GLUCONATE SCH MG: 324 TABLET ORAL at 16:15

## 2019-06-27 RX ADMIN — PROBIOTIC PRODUCT - TAB SCH EA: TAB at 07:47

## 2019-06-27 RX ADMIN — MEROPENEM AND SODIUM CHLORIDE SCH MLS/HR: 1 INJECTION, SOLUTION INTRAVENOUS at 14:23

## 2019-06-27 RX ADMIN — PROBIOTIC PRODUCT - TAB SCH EA: TAB at 16:15

## 2019-06-27 RX ADMIN — MEROPENEM AND SODIUM CHLORIDE SCH MLS/HR: 1 INJECTION, SOLUTION INTRAVENOUS at 21:52

## 2019-06-27 RX ADMIN — MEROPENEM AND SODIUM CHLORIDE SCH MLS/HR: 1 INJECTION, SOLUTION INTRAVENOUS at 05:57

## 2019-06-27 RX ADMIN — FERROUS GLUCONATE SCH MG: 324 TABLET ORAL at 07:47

## 2019-06-27 RX ADMIN — HEPARIN SODIUM AND DEXTROSE SCH MLS/HR: 10000; 5 INJECTION INTRAVENOUS at 09:57

## 2019-06-27 RX ADMIN — IBUPROFEN PRN MG: 800 TABLET, FILM COATED ORAL at 07:48

## 2019-06-27 RX ADMIN — POTASSIUM CHLORIDE SCH MEQ: 750 TABLET, EXTENDED RELEASE ORAL at 07:48

## 2019-06-27 RX ADMIN — FERROUS GLUCONATE SCH MG: 324 TABLET ORAL at 21:51

## 2019-06-27 NOTE — IPNPDOC
Text Note


Date of Service


The patient was seen on 6/27/19.





NOTE





Pt was seen and examined at bedside. Pt denies any fever or chills. INR 2.56 on 

Warfarin 6mg. Pt denies any abdominal pain. Denies any breathing difficulty. O2 

sat on RA WNL. Plan for DC in AM after 48 hr therapeutic INR with Heparin gtt.





PHE:





VITAL SIGNS: Please see below. 


GENERAL: AAOx3 NAD pt severely pale appearing


HEENT: no jaundice neck supple no thyromegaly


CARDIOVASCULAR: Regular rate and rhythm S1 S2


RESPIRATORY: clear bilat no wheeze no rales


ABDOMINAL: soft NT ND


EXTREMITIES: No peripheral edema, pedal pulses are palpable


NEUROLOGICAL: No focal neuromotor or sensory deficit


PSYCHOLOGICAL: mood affect appropriate








Echocardiogram: 





DESCRIPTION:


Rhythm was sinus tachycardia.  Image quality was good.  No pericardial effusion.


This was a 2D, M mode, color flow Doppler and pulse wave Doppler examination and


included mitral annular tissue Doppler.


 


CONCLUSIONS:


1.  Suggestive of at least mild-moderate elevation of estimated right ventricle


systolic pressure (at least 39-44 mmHg).  Normal right ventricle size and


systolic function.  Normal central venous pressure (estimated to be  5-10 mmHg).


 


 


2.  Normal left ventricle internal dimensions and wall thickness.  Normal


regional LV wall motion and wall thickening.  Normal LV systolic function.  LVEF


65-70% by visual estimate.  Normal LV diastolic function.


 


3.  Otherwise, normal appearing echocardiogram Doppler findings.





Vital Signs








  Date Time  Temp Pulse Resp B/P (MAP) Pulse Ox O2 Delivery O2 Flow Rate FiO2


 


6/27/19 20:00 99.0 93 16 124/69 (87) 97   


 


6/27/19 16:18 97.7 88 18 133/79 (97) 96   


 


6/27/19 11:32 98.0 76 18 105/64 (78) 97   


 


6/27/19 08:00 98.1 87 18 125/82 (96) 95   


 


6/27/19 04:00 96.9 87 22 124/59 (80) 92   


 


6/26/19 23:59 98.8 80 20 131/70 (90) 100   














Intake & Output 


 


 6/27/19





 06:00


 


Intake Total 1536 ml


 


Output Total 950 ml


 


Balance 586 ml





Laboratory Tests


6/27/19 05:48: 


White Blood Count 10.8H, Red Blood Count 2.79L, Hemoglobin 8.2L, Hematocrit 

25.1L, Mean Corpuscular Volume 90.0, Mean Corpuscular Hemoglobin 29.4, Mean 

Corpuscular Hemoglobin Concent 32.7, Red Cell Distribution Width 13.8, Platelet 

Count 254, Nucleated Red Blood Cells % (auto) 0.3H, Prothrombin Time 27.4H, 

Prothromb Time International Ratio 2.56, Activated Partial Thromboplast Time 

96.6H, Blood Urea Nitrogen 16, Creatinine 0.45L, Sodium Level 144, Potassium 

Level 3.9, Chloride Level 115H, Carbon Dioxide Level 21, Calcium Level 8.1L, 

Aspartate Amino Transf (AST/SGOT) 45H, Alanine Aminotransferase (ALT/SGPT) 96H, 

Alkaline Phosphatase 284H, Total Bilirubin 0.3, Total Protein 5.5L, Albumin 

1.9L, Anion Gap 8, Glomerular Filtration Rate > 60.0, Fasting Glucose 87, 

Albumin/Globulin Ratio 0.53L





Microbiology


6/22/19 Blood Culture - Final, Complete


          NO GROWTH AFTER 5 DAYS


6/22/19 Blood Culture - Final, Complete


          NO GROWTH AFTER 5 DAYS


6/24/19 Urine Culture - Final, Complete


          





Current Medications








 Medications


  (Trade)  Dose


 Ordered  Sig/Bryn


 Route


 PRN Reason  Start Time


 Stop Time Status Last Admin


Dose Admin


 


 Ferrous Gluconate


  (Fergon)  324 mg  TID


 PO


   6/26/19 21:00


    6/27/19 21:51


324 MG


 


 Heparin Sodium


  (Porcine)


  (Heparin)    ASDIRECTED  PRN


 IV


 SEE LABEL COMMENTS  6/22/19 14:00


    6/25/19 20:04


2,900 UNITS


 


 Heparin Sodium


  (Porcine) 35549


 units/IV


 Miscellaneous


 Supplies  250 ml @ 0


 mls/hr  Q0M


 IV


   6/22/19 13:58


    6/27/19 09:57


11 MLS/HR


 


 Ibuprofen


  (Advil)  800 mg  Q8HP  PRN


 PO


 FEVER  6/24/19 16:30


    6/27/19 07:48


800 MG


 


 Lactobacillus


 Acidophilus


  (Bacid)  1 ea  TID


 PO


   6/22/19 16:00


    6/27/19 21:51


1 EA


 


 Meropenem 1 gm/IV


 Miscellaneous


 Supplies  50 ml @ 


 100 mls/hr  Q8H


 IV


   6/24/19 22:00


    6/27/19 21:52


100 MLS/HR


 


 Potassium Chloride


  (Micro-K


 Extencaps)  20 meq  DAILY


 PO


   6/24/19 09:00


    6/27/19 07:48


20 MEQ


 


 Senna/Docusate


 Sodium


  (Senokot S)  2 tab  BID


 PO


   6/22/19 21:00


    6/27/19 21:51


2 TAB


 


 Warfarin Sodium


  (Coumadin)  6 mg  DAILY@17


 PO


   6/27/19 17:00


    6/27/19 16:15


6 MG








ASSESSMENT/PLAN:





1. Sepsis sec to postpartum endometritis , resolved


hemodynamically improved


WBC trended down


On meropenem due to PCN allergy


Blood Culture from outside [facility]


Blood Culture on 6/22 negative x2


Denies any lower abdomen pain tenderness or vaginal discharge


switch to PO upon DC





2.Bilateral PE


Echo reviewed, mild to moderate stress on Rt Ventricle


Cont close clinical monitoring


As evidenced in CTA from outside, record not available


Cont Heparin gtt 


Warfarin dose adjusted, INR in AM, last 2.56


Doppler LE negative





3. Elevated liver enzymes


cont to monitor 


likely sec to postpartum status





As per review of literature it is recommended to continue anticoagulation for at

least 3 months in postpartum status in a pt with current clinical presentation.


It was discussed with pt and  importance of completing course of PO 

antibiotics upon dc and anticoagulation with Warfarin with close monitoring of 

INR as per PCP. Pt and  verbalized understanding.





VS,Fishbone, I+O


VS, Fishbone, I+O


Laboratory Tests


6/27/19 05:48








Red Blood Count 2.79 L, Mean Corpuscular Volume 90.0, Mean Corpuscular 

Hemoglobin 29.4, Mean Corpuscular Hemoglobin Concent 32.7, Red Cell Distribution

Width 13.8, Calcium Level 8.1 L, Aspartate Amino Transf (AST/SGOT) 45 H, Alanine

Aminotransferase (ALT/SGPT) 96 H, Alkaline Phosphatase 284 H, Total Bilirubin 

0.3, Total Protein 5.5 L, Albumin 1.9 L








Vital Signs








  Date Time  Temp Pulse Resp B/P (MAP) Pulse Ox O2 Delivery O2 Flow Rate FiO2


 


6/27/19 16:18 97.7 88 18 133/79 (97) 96   


 


6/26/19 08:00       2.0 














I&O- Last 24 Hours up to 6 AM 


 


 6/27/19





 06:00


 


Intake Total 1536 ml


 


Output Total 950 ml


 


Balance 586 ml

















BHARATHI AQUINO MD             Jun 27, 2019 17:44

## 2019-06-28 VITALS — DIASTOLIC BLOOD PRESSURE: 71 MMHG | SYSTOLIC BLOOD PRESSURE: 137 MMHG

## 2019-06-28 VITALS — SYSTOLIC BLOOD PRESSURE: 137 MMHG | DIASTOLIC BLOOD PRESSURE: 92 MMHG

## 2019-06-28 VITALS — DIASTOLIC BLOOD PRESSURE: 81 MMHG | SYSTOLIC BLOOD PRESSURE: 134 MMHG

## 2019-06-28 LAB
ALBUMIN SERPL BCG-MCNC: 2 GM/DL (ref 3.2–5.2)
ALT SERPL W P-5'-P-CCNC: 81 U/L (ref 12–78)
APTT BLD: 105.5 SECONDS (ref 25–38.4)
BILIRUB SERPL-MCNC: 0.2 MG/DL (ref 0.2–1)
BUN SERPL-MCNC: 15 MG/DL (ref 7–18)
CALCIUM SERPL-MCNC: 8.1 MG/DL (ref 8.5–10.1)
CHLORIDE SERPL-SCNC: 112 MEQ/L (ref 98–107)
CO2 SERPL-SCNC: 23 MEQ/L (ref 21–32)
CREAT SERPL-MCNC: 0.43 MG/DL (ref 0.55–1.3)
GFR SERPL CREATININE-BSD FRML MDRD: > 60 ML/MIN/{1.73_M2} (ref 60–?)
GLUCOSE SERPL-MCNC: 79 MG/DL (ref 70–100)
INR PPP: 3.9
POTASSIUM SERPL-SCNC: 4.4 MEQ/L (ref 3.5–5.1)
PROT SERPL-MCNC: 5.3 GM/DL (ref 6.4–8.2)
PROTHROMBIN TIME: 38.3 SECONDS (ref 11.8–14)
SODIUM SERPL-SCNC: 142 MEQ/L (ref 136–145)

## 2019-06-28 RX ADMIN — POTASSIUM CHLORIDE SCH MEQ: 750 TABLET, EXTENDED RELEASE ORAL at 09:41

## 2019-06-28 RX ADMIN — IBUPROFEN PRN MG: 800 TABLET, FILM COATED ORAL at 01:15

## 2019-06-28 RX ADMIN — FERROUS GLUCONATE SCH MG: 324 TABLET ORAL at 09:41

## 2019-06-28 RX ADMIN — DOCUSATE SODIUM,SENNOSIDES SCH TAB: 50; 8.6 TABLET, FILM COATED ORAL at 09:41

## 2019-06-28 RX ADMIN — PROBIOTIC PRODUCT - TAB SCH EA: TAB at 09:41

## 2019-06-28 RX ADMIN — MEROPENEM AND SODIUM CHLORIDE SCH MLS/HR: 1 INJECTION, SOLUTION INTRAVENOUS at 05:41

## 2019-06-29 NOTE — DS.PDOC
Discharge Summary


General


Date of Admission


2019 at 12:57


Date of Discharge


2019


Attending Physician:  BHARATHI AQUINO MD





Discharge Summary


PROCEDURES PERFORMED DURING STAY: None





ADMITTING DIAGNOSES: 


1. Sepsis sec to postpartum endometritis, 


2. post partum Endometritis


2. Bilateral PE


4 Trans aminitis and Elevated liver enzymes.





DISCHARGE DIAGNOSES:


1. As above.





COMPLICATIONS/CHIEF COMPLAINT: Severe Sepsis Post Partum, PE.





HISTORY OF PRESENT ILLNESS: 


27 yo F, post-partum day 3, no other known PMH transferred from [facility] while she was treated for severe sepsis under Dr. Sherri Dotson. She had a Normal Vaginal Delivery on 2019, and she was successfully

discharged. However, she developed a fever, chills, and presented to Dr. Kruse's office. She was found to have leukocytosis up to 21.6 with symptoms of 

sepsis. Therefore, the patient was hospitalized. Initial CBC 21.6>10.1<175. 

Lactate 3.6. She was tachycardic. She has evidence of Endometritis. Of note, she

was found to have positive urine culture in 2019 with Strep Agalactiae group

B, but the patient decided not to be treated. During this hospital admission, 

two sets of blood culture showed G + cocci. Urine culture is in progress, but UA

showed large UE, blood, and WBC 30-50. Lactate has normalized to 0.9. However, 

she was still tachycardic to 130-140. Therefore, Dr. Dotson had CT angio, which 

confirmed PE in b/l lungs. Given post-partum d 3 bleeding, she received lovenox 

40 subcut. Given complicated hospital course, the patient was transferred to 

[facility] for further medical management. 





Upon arrival, /68, , 95-97% on RA. She is febrile at 101F.





HOSPITAL COURSE: 


1. Sepsis sec to postpartum endometritis , resolved, hemodynamically improved, 

WBC trended down, On meropenem 


Blood Culture from outside [facility]


Blood Culture on  negative x2


Denied any lower abdomen pain tenderness or vaginal discharge switched to PO 

upon DC





2.Bilateral PE


As evidenced in CTA from outside,


Echo: mild to moderate stress on Rt Ventricle


Doppler ofb/L LE negative


Started on Heparin gtt and Warfarin, dose adjusted accordingly, 


Was DC with warfarin, was informed about the risk of bleeding and advised to 

follow with PCP or Coumadin clinic





3. Elevated liver enzymes, transaminitis 


likely sec to postpartum status, Improved








DISCHARGE MEDICATIONS: Please see below.


 


ALLERGIES: Please see below.





PHYSICAL EXAMINATION ON DISCHARGE:


VITAL SIGNS: Please see below. 


GENERAL: AAOx3 NAD pt severely pale appearing


HEENT: no jaundice neck supple no thyromegaly


CARDIOVASCULAR: Regular rate and rhythm S1 S2


RESPIRATORY: clear bilat no wheeze no rales


ABDOMINAL: soft NT ND


EXTREMITIES: No peripheral edema, pedal pulses are palpable


NEUROLOGICAL: No focal neuromotor or sensory deficit


PSYCHOLOGICAL: mood affect appropriate





LABORATORY DATA: Please see below.





IMAGIN/ B/L LE Doppler 19: No evidence for deep venous thrombosis.


   2/ RUQ Abd US 19 :


      Impression:


      There is ascites along the medial margin of the hepatic right lobe.


      There is no cholelithiasis.


      There is pericholecystic fluid, possibly ascites.


      The gallbladder wall is thickened measuring up to 6.90 mm, possibly 

secondary to the ascites.


      There is no biliary duct dilatation.


   


   3/ CXR on 19


      IMPRESSION: 


      1. Bibasilar airspace opacities, which represent atelectasis, pneumonia, 

or aspiration pneumonia. 


      2. Blunting of the costophrenic sulci bilaterally, which may indicate 

bilateral pleural effusions. 





ECHO:


   DATE OF STUDY:  2019


   CONCLUSIONS:   


   1.  Suggestive of at least mild-moderate elevation of estimated right 

ventricle systolic pressure (at least 39-44 mmHg).  Normal right ventricle size 

and


systolic function.  Normal central venous pressure (estimated to be  5-10 mmHg).


   2.  Normal left ventricle internal dimensions and wall thickness.  Normal 

regional LV wall motion and wall thickening.  Normal LV systolic function.  LVEF


65-70% by visual estimate.  Normal LV diastolic function.


   3.  Otherwise, normal appearing echocardiogram Doppler findings.





PROGNOSIS: Fair





ACTIVITY: As tolerated, avoid trauma.





DIET: Regular





DISCHARGE PLAN: 


As per patient her PCN allergy was limited to rash 20 years ago. No reaction 

while since then, However was advised to return to ED with any side effects. 

Augmentin PO prescribed to complete course of antibiotics for 7 days.


Needs to follow the INR for Warfarin dose adjustment





DISPOSITION: 01 Home, Self-Care.





DISCHARGE INSTRUCTIONS:


1. Return to ED with any warning signs as explained to the patient such as 

dizziness, dyspnea, chest pain, skin bruises or active bleeding





ITEMS TO FOLLOWUP ON ON OUTPATIENT:


1. Follow with PCP in a week for Warfarin dose adjustment


2. Follow with Primary OB/GYN





DISCHARGE CONDITION: Stable, afebrile, no respiratory distress,palpitation or 

chest pain.





TIME SPENT ON DISCHARGE: Greater than 25 minutes.





Vital Signs/I&Os





Vital Signs








  Date Time  Temp Pulse Resp B/P (MAP) Pulse Ox O2 Delivery O2 Flow Rate FiO2


 


19 11:00 98.0 74 18 137/71 (93) 98   


 


19 08:00       2.0 














I&O- Last 24 Hours up to 6 AM 


 


 19





 06:00


 


Intake Total 50 ml


 


Output Total 0 ml


 


Balance 50 ml











Laboratory Data


Labs 24H


Laboratory Tests


19 05:35








Calcium Level 8.1 L, Aspartate Amino Transf (AST/SGOT) 26, Alanine 

Aminotransferase (ALT/SGPT) 81 H, Alkaline Phosphatase 273 H, Total Bilirubin 

0.2, Total Protein 5.3 L, Albumin 2.0 L


CBC/BMP





Laboratory Tests








Test


 19


06:11 19


05:48 19


05:35


 


Blood Urea Nitrogen


 15 MG/DL


(7-18) 16 MG/DL


(7-18) 15 MG/DL


(7-18)


 


Creatinine


 0.52 MG/DL


(0.55-1.30) 0.45 MG/DL


(0.55-1.30) 0.43 MG/DL


(0.55-1.30)


 


Sodium Level


 144 MEQ/L


(136-145) 144 MEQ/L


(136-145) 142 MEQ/L


(136-145)


 


Potassium Level


 3.6 MEQ/L


(3.5-5.1) 3.9 MEQ/L


(3.5-5.1) 4.4 MEQ/L


(3.5-5.1)


 


Chloride Level


 115 MEQ/L


() 115 MEQ/L


() 112 MEQ/L


()


 


Carbon Dioxide Level


 20 MEQ/L


(21-32) 21 MEQ/L


(21-32) 23 MEQ/L


(21-32)


 


Calcium Level


 7.9 MG/DL


(8.5-10.1) 8.1 MG/DL


(8.5-10.1) 8.1 MG/DL


(8.5-10.1)


 


Aspartate Amino Transf


(AST/SGOT) 43 U/L (7-37) 


 45 U/L (7-37) 


 26 U/L (7-37) 





 


Alanine Aminotransferase


(ALT/SGPT) 109 U/L


(12-78) 96 U/L (12-78) 


 81 U/L (12-78) 





 


Alkaline Phosphatase


 293 U/L


() 284 U/L


() 273 U/L


()


 


Total Bilirubin


 0.3 MG/DL


(0.2-1.0) 0.3 MG/DL


(0.2-1.0) 0.2 MG/DL


(0.2-1.0)


 


Total Protein


 5.5 GM/DL


(6.4-8.2) 5.5 GM/DL


(6.4-8.2) 5.3 GM/DL


(6.4-8.2)


 


Albumin


 1.8 GM/DL


(3.2-5.2) 1.9 GM/DL


(3.2-5.2) 2.0 GM/DL


(3.2-5.2)





Laboratory Tests


19 06:11








Red Blood Count 2.79, Mean Corpuscular Volume 87.5, Mean Corpuscular Hemoglobin 

28.3, Mean Corpuscular Hemoglobin Concent 32.4, Red Cell Distribution Width 

13.7, Calcium Level 7.9, Aspartate Amino Transf (AST/SGOT) 43, Alanine 

Aminotransferase (ALT/SGPT) 109, Alkaline Phosphatase 293, Total Bilirubin 0.3, 

Total Protein 5.5, Albumin 1.8





19 05:48








Red Blood Count 2.79, Mean Corpuscular Volume 90.0, Mean Corpuscular Hemoglobin 

29.4, Mean Corpuscular Hemoglobin Concent 32.7, Red Cell Distribution Width 

13.8, Calcium Level 8.1, Aspartate Amino Transf (AST/SGOT) 45, Alanine Am

inotransferase (ALT/SGPT) 96, Alkaline Phosphatase 284, Total Bilirubin 0.3, 

Total Protein 5.5, Albumin 1.9





19 05:35








Calcium Level 8.1, Aspartate Amino Transf (AST/SGOT) 26, Alanine 

Aminotransferase (ALT/SGPT) 81, Alkaline Phosphatase 273, Total Bilirubin 0.2, 

Total Protein 5.3, Albumin 2.0





Microbiology





Microbiology


19 Blood Culture - Final, Complete


          NO GROWTH AFTER 5 DAYS


19 Blood Culture - Final, Complete


          NO GROWTH AFTER 5 DAYS


19 Urine Culture - Final, Complete





Discharge Medications


Scheduled


Amoxicillin/Potassium Clav (Augmentin 875-125 Tablet) 1 Each Tablet, 1 TAB PO 

BID for Endometritis


Docusate Sodium (Colace) 100 Mg Capsule, 100 MG PO BID, (Reported)


Ferrous Sulfate (Iron) 325 Mg Tablet, 325 MG PO Q8H, (Reported)


Folic Acid (Folic Acid) 1 Mg Tablet, 1 MG PO DAILY, (Reported)


Prenatal Vit37/Iron/Folic Acid (Prenata Chewable Tablet) 1 Each Tab.chew, 1 CHW 

PO DAILY, (Reported)


Sennosides/Docusate Sodium (Senna-S Tablet) 1 Each Tablet, 1 TAB PO BID, 

(Reported)


Warfarin Sodium (Warfarin Sodium) 4 Mg Tablet, 4 MG PO DAILY





Scheduled PRN


Acetaminophen (Acetaminophen) 325 Mg Tablet, 650 MG PO Q4H PRN for PAIN, 

(Reported)


Ibuprofen (Ibuprofen) 800 Mg Tablet, 800 MG PO TID PRN for PAIN, (Reported)





Allergies


Coded Allergies:  


     amoxicillin (Verified  Allergy, Unknown, HIVES, 19)


     cefprozil (Verified  Allergy, Unknown, HIVES, 19)











BHARATHI AQUINO MD             2019 11:39

## 2019-07-03 ENCOUNTER — HOSPITAL ENCOUNTER (OUTPATIENT)
Dept: HOSPITAL 53 - M LABDRWAD | Age: 29
End: 2019-07-03
Attending: FAMILY MEDICINE
Payer: COMMERCIAL

## 2019-07-03 LAB
INR PPP: 2.24
PROTHROMBIN TIME: 24.6 SECONDS (ref 11.8–14)

## 2019-07-26 ENCOUNTER — HOSPITAL ENCOUNTER (OUTPATIENT)
Dept: HOSPITAL 53 - M LABDRWAD | Age: 29
End: 2019-07-26
Attending: FAMILY MEDICINE
Payer: COMMERCIAL

## 2019-07-26 LAB
INR PPP: 1.88
PROTHROMBIN TIME: 21.4 SECONDS (ref 11.8–14)

## 2019-08-14 ENCOUNTER — HOSPITAL ENCOUNTER (OUTPATIENT)
Dept: HOSPITAL 53 - M RAD | Age: 29
End: 2019-08-14
Attending: INTERNAL MEDICINE
Payer: COMMERCIAL

## 2019-08-14 DIAGNOSIS — J98.11: ICD-10-CM

## 2019-08-14 DIAGNOSIS — Z86.711: Primary | ICD-10-CM

## 2019-08-14 PROCEDURE — 71275 CT ANGIOGRAPHY CHEST: CPT

## 2019-08-27 NOTE — REP
CT ANGIOGRAM CHEST:

 

TECHNIQUE:  Axial contrast enhanced images from the thoracic inlet to the upper

abdomen using 100 mL Isovue 370 intravenous contrast material with multiplanar

reformations.

 

Delay in interpretation awaiting outside prior study dated 06/22/2019 which has

arrived 08/27/2019.

 

The previously noted small central pulmonary emboli in the right middle and lower

lobe have resolved. No pulmonary emboli are seen at this time. There is no

thoracic aortic aneurysm or dissection. The heart is normal in size. There is no

portal or pericardial effusion with resolution of the previously noted small

right effusion. Small amount of residual thymic tissue is seen in the anterior

mediastinum. There is no evidence of mediastinal, hilar or chest wall

lymphadenopathy. Lung fields show a small patchy of atelectasis or infiltrate in

the posterior costophrenic sulcus in the right. Previously noted

infiltrates/atelectasis and bilateral lower lobes have otherwise essentially

cleared.

 

IMPRESSION:

 

Previously noted small pulmonary emboli in the right middle and lower lobes have

resolved. No current evidence of pulmonary embolism. Previously noted small right

effusion and bilateral lower lung infiltrates/atelectasis have cleared. There is

a small patch of atelectasis/infiltrate in the right posterior costophrenic

sulcus.

 

 

Electronically Signed by

Francisco Amador MD 08/28/2019 12:18 A